# Patient Record
Sex: FEMALE | Race: WHITE
[De-identification: names, ages, dates, MRNs, and addresses within clinical notes are randomized per-mention and may not be internally consistent; named-entity substitution may affect disease eponyms.]

---

## 2017-08-26 ENCOUNTER — HEALTH MAINTENANCE LETTER (OUTPATIENT)
Age: 42
End: 2017-08-26

## 2019-11-07 ENCOUNTER — HEALTH MAINTENANCE LETTER (OUTPATIENT)
Age: 44
End: 2019-11-07

## 2020-11-29 ENCOUNTER — HEALTH MAINTENANCE LETTER (OUTPATIENT)
Age: 45
End: 2020-11-29

## 2021-02-14 ENCOUNTER — HEALTH MAINTENANCE LETTER (OUTPATIENT)
Age: 46
End: 2021-02-14

## 2021-09-25 ENCOUNTER — HEALTH MAINTENANCE LETTER (OUTPATIENT)
Age: 46
End: 2021-09-25

## 2022-01-09 ENCOUNTER — HEALTH MAINTENANCE LETTER (OUTPATIENT)
Age: 47
End: 2022-01-09

## 2022-03-06 ENCOUNTER — HEALTH MAINTENANCE LETTER (OUTPATIENT)
Age: 47
End: 2022-03-06

## 2022-12-26 ENCOUNTER — HEALTH MAINTENANCE LETTER (OUTPATIENT)
Age: 47
End: 2022-12-26

## 2023-04-16 ENCOUNTER — HEALTH MAINTENANCE LETTER (OUTPATIENT)
Age: 48
End: 2023-04-16

## 2023-10-05 ENCOUNTER — APPOINTMENT (RX ONLY)
Dept: URBAN - METROPOLITAN AREA CLINIC 115 | Facility: CLINIC | Age: 48
Setting detail: DERMATOLOGY
End: 2023-10-05

## 2023-10-05 DIAGNOSIS — L70.0 ACNE VULGARIS: ICD-10-CM | Status: INADEQUATELY CONTROLLED

## 2023-10-05 PROCEDURE — ? COUNSELING

## 2023-10-05 PROCEDURE — ? TREATMENT REGIMEN

## 2023-10-05 PROCEDURE — ? ISOTRETINOIN INITIATION

## 2023-10-05 PROCEDURE — 99204 OFFICE O/P NEW MOD 45 MIN: CPT

## 2023-10-05 PROCEDURE — ? PRESCRIPTION

## 2023-10-05 ASSESSMENT — LOCATION SIMPLE DESCRIPTION DERM
LOCATION SIMPLE: LEFT CHEEK
LOCATION SIMPLE: RIGHT CHEEK

## 2023-10-05 ASSESSMENT — LOCATION DETAILED DESCRIPTION DERM
LOCATION DETAILED: RIGHT INFERIOR CENTRAL MALAR CHEEK
LOCATION DETAILED: LEFT INFERIOR CENTRAL MALAR CHEEK

## 2023-10-05 ASSESSMENT — SEVERITY ASSESSMENT OVERALL AMONG ALL PATIENTS
IN YOUR EXPERIENCE, AMONG ALL PATIENTS YOU HAVE SEEN WITH THIS CONDITION, HOW SEVERE IS THIS PATIENT'S CONDITION?: MULTIPLE INFLAMMATORY LESIONS BUT NONINFLAMMATORY LESIONS PREDOMINATE

## 2023-10-05 ASSESSMENT — LOCATION ZONE DERM: LOCATION ZONE: FACE

## 2023-10-05 NOTE — PROCEDURE: TREATMENT REGIMEN
Initiate Treatment: Absorica 30 mg capsule \\nSig: Take one capsule daily with fatty meal.
Detail Level: Zone

## 2023-10-05 NOTE — PROCEDURE: COUNSELING
Use Enhanced Medication Counseling?: No
Azelaic Acid Pregnancy And Lactation Text: This medication is considered safe during pregnancy and breast feeding.
Spironolactone Counseling: Patient advised regarding risks of diarrhea, abdominal pain, hyperkalemia, birth defects (for female patients), liver toxicity and renal toxicity. The patient may need blood work to monitor liver and kidney function and potassium levels while on therapy. The patient verbalized understanding of the proper use and possible adverse effects of spironolactone.  All of the patient's questions and concerns were addressed.
Isotretinoin Pregnancy And Lactation Text: This medication is Pregnancy Category X and is considered extremely dangerous during pregnancy. It is unknown if it is excreted in breast milk.
Topical Clindamycin Counseling: Patient counseled that this medication may cause skin irritation or allergic reactions.  In the event of skin irritation, the patient was advised to reduce the amount of the drug applied or use it less frequently.   The patient verbalized understanding of the proper use and possible adverse effects of clindamycin.  All of the patient's questions and concerns were addressed.
Dapsone Counseling: I discussed with the patient the risks of dapsone including but not limited to hemolytic anemia, agranulocytosis, rashes, methemoglobinemia, kidney failure, peripheral neuropathy, headaches, GI upset, and liver toxicity.  Patients who start dapsone require monitoring including baseline LFTs and weekly CBCs for the first month, then every month thereafter.  The patient verbalized understanding of the proper use and possible adverse effects of dapsone.  All of the patient's questions and concerns were addressed.
Aklief Pregnancy And Lactation Text: It is unknown if this medication is safe to use during pregnancy.  It is unknown if this medication is excreted in breast milk.  Breastfeeding women should use the topical cream on the smallest area of the skin for the shortest time needed while breastfeeding.  Do not apply to nipple and areola.
Tazorac Counseling:  Patient advised that medication is irritating and drying.  Patient may need to apply sparingly and wash off after an hour before eventually leaving it on overnight.  The patient verbalized understanding of the proper use and possible adverse effects of tazorac.  All of the patient's questions and concerns were addressed.
Winlevi Pregnancy And Lactation Text: This medication is considered safe during pregnancy and breastfeeding.
Azithromycin Pregnancy And Lactation Text: This medication is considered safe during pregnancy and is also secreted in breast milk.
Bactrim Counseling:  I discussed with the patient the risks of sulfa antibiotics including but not limited to GI upset, allergic reaction, drug rash, diarrhea, dizziness, photosensitivity, and yeast infections.  Rarely, more serious reactions can occur including but not limited to aplastic anemia, agranulocytosis, methemoglobinemia, blood dyscrasias, liver or kidney failure, lung infiltrates or desquamative/blistering drug rashes.
Topical Sulfur Applications Pregnancy And Lactation Text: This medication is Pregnancy Category C and has an unknown safety profile during pregnancy. It is unknown if this topical medication is excreted in breast milk.
Doxycycline Pregnancy And Lactation Text: This medication is Pregnancy Category D and not consider safe during pregnancy. It is also excreted in breast milk but is considered safe for shorter treatment courses.
Minocycline Counseling: Patient advised regarding possible photosensitivity and discoloration of the teeth, skin, lips, tongue and gums.  Patient instructed to avoid sunlight, if possible.  When exposed to sunlight, patients should wear protective clothing, sunglasses, and sunscreen.  The patient was instructed to call the office immediately if the following severe adverse effects occur:  hearing changes, easy bruising/bleeding, severe headache, or vision changes.  The patient verbalized understanding of the proper use and possible adverse effects of minocycline.  All of the patient's questions and concerns were addressed.
Tetracycline Pregnancy And Lactation Text: This medication is Pregnancy Category D and not consider safe during pregnancy. It is also excreted in breast milk.
Topical Retinoid counseling:  Patient advised to apply a pea-sized amount only at bedtime and wait 30 minutes after washing their face before applying.  If too drying, patient may add a non-comedogenic moisturizer. The patient verbalized understanding of the proper use and possible adverse effects of retinoids.  All of the patient's questions and concerns were addressed.
Birth Control Pills Counseling: Birth Control Pill Counseling: I discussed with the patient the potential side effects of OCPs including but not limited to increased risk of stroke, heart attack, thrombophlebitis, deep venous thrombosis, hepatic adenomas, breast changes, GI upset, headaches, and depression.  The patient verbalized understanding of the proper use and possible adverse effects of OCPs. All of the patient's questions and concerns were addressed.
Erythromycin Pregnancy And Lactation Text: This medication is Pregnancy Category B and is considered safe during pregnancy. It is also excreted in breast milk.
Sarecycline Counseling: Patient advised regarding possible photosensitivity and discoloration of the teeth, skin, lips, tongue and gums.  Patient instructed to avoid sunlight, if possible.  When exposed to sunlight, patients should wear protective clothing, sunglasses, and sunscreen.  The patient was instructed to call the office immediately if the following severe adverse effects occur:  hearing changes, easy bruising/bleeding, severe headache, or vision changes.  The patient verbalized understanding of the proper use and possible adverse effects of sarecycline.  All of the patient's questions and concerns were addressed.
Birth Control Pills Pregnancy And Lactation Text: This medication should be avoided if pregnant and for the first 30 days post-partum.
Tazorac Pregnancy And Lactation Text: This medication is not safe during pregnancy. It is unknown if this medication is excreted in breast milk.
Azelaic Acid Counseling: Patient counseled that medicine may cause skin irritation and to avoid applying near the eyes.  In the event of skin irritation, the patient was advised to reduce the amount of the drug applied or use it less frequently.   The patient verbalized understanding of the proper use and possible adverse effects of azelaic acid.  All of the patient's questions and concerns were addressed.
Isotretinoin Counseling: Patient should get monthly blood tests, not donate blood, not drive at night if vision affected, not share medication, and not undergo elective surgery for 6 months after tx completed. Side effects reviewed, pt to contact office should one occur.
Topical Clindamycin Pregnancy And Lactation Text: This medication is Pregnancy Category B and is considered safe during pregnancy. It is unknown if it is excreted in breast milk.
Spironolactone Pregnancy And Lactation Text: This medication can cause feminization of the male fetus and should be avoided during pregnancy. The active metabolite is also found in breast milk.
Benzoyl Peroxide Counseling: Patient counseled that medicine may cause skin irritation and bleach clothing.  In the event of skin irritation, the patient was advised to reduce the amount of the drug applied or use it less frequently.   The patient verbalized understanding of the proper use and possible adverse effects of benzoyl peroxide.  All of the patient's questions and concerns were addressed.
Azithromycin Counseling:  I discussed with the patient the risks of azithromycin including but not limited to GI upset, allergic reaction, drug rash, diarrhea, and yeast infections.
Dapsone Pregnancy And Lactation Text: This medication is Pregnancy Category C and is not considered safe during pregnancy or breast feeding.
High Dose Vitamin A Counseling: Side effects reviewed, pt to contact office should one occur.
Erythromycin Counseling:  I discussed with the patient the risks of erythromycin including but not limited to GI upset, allergic reaction, drug rash, diarrhea, increase in liver enzymes, and yeast infections.
Doxycycline Counseling:  Patient counseled regarding possible photosensitivity and increased risk for sunburn.  Patient instructed to avoid sunlight, if possible.  When exposed to sunlight, patients should wear protective clothing, sunglasses, and sunscreen.  The patient was instructed to call the office immediately if the following severe adverse effects occur:  hearing changes, easy bruising/bleeding, severe headache, or vision changes.  The patient verbalized understanding of the proper use and possible adverse effects of doxycycline.  All of the patient's questions and concerns were addressed.
High Dose Vitamin A Pregnancy And Lactation Text: High dose vitamin A therapy is contraindicated during pregnancy and breast feeding.
Tetracycline Counseling: Patient counseled regarding possible photosensitivity and increased risk for sunburn.  Patient instructed to avoid sunlight, if possible.  When exposed to sunlight, patients should wear protective clothing, sunglasses, and sunscreen.  The patient was instructed to call the office immediately if the following severe adverse effects occur:  hearing changes, easy bruising/bleeding, severe headache, or vision changes.  The patient verbalized understanding of the proper use and possible adverse effects of tetracycline.  All of the patient's questions and concerns were addressed. Patient understands to avoid pregnancy while on therapy due to potential birth defects.
Benzoyl Peroxide Pregnancy And Lactation Text: This medication is Pregnancy Category C. It is unknown if benzoyl peroxide is excreted in breast milk.
Topical Sulfur Applications Counseling: Topical Sulfur Counseling: Patient counseled that this medication may cause skin irritation or allergic reactions.  In the event of skin irritation, the patient was advised to reduce the amount of the drug applied or use it less frequently.   The patient verbalized understanding of the proper use and possible adverse effects of topical sulfur application.  All of the patient's questions and concerns were addressed.
Bactrim Pregnancy And Lactation Text: This medication is Pregnancy Category D and is known to cause fetal risk.  It is also excreted in breast milk.
Aklief counseling:  Patient advised to apply a pea-sized amount only at bedtime and wait 30 minutes after washing their face before applying.  If too drying, patient may add a non-comedogenic moisturizer.  The most commonly reported side effects including irritation, redness, scaling, dryness, stinging, burning, itching, and increased risk of sunburn.  The patient verbalized understanding of the proper use and possible adverse effects of retinoids.  All of the patient's questions and concerns were addressed.
Topical Retinoid Pregnancy And Lactation Text: This medication is Pregnancy Category C. It is unknown if this medication is excreted in breast milk.
Winlevi Counseling:  I discussed with the patient the risks of topical clascoterone including but not limited to erythema, scaling, itching, and stinging. Patient voiced their understanding.
Detail Level: Zone

## 2023-10-05 NOTE — PROCEDURE: ISOTRETINOIN INITIATION
Detail Level: Zone
Anticipated Starting Dosage (Optional): 30mg Daily
Ipledge Number (Optional): [id number]
Patient Reported Weight (Optional - Include Units): 123

## 2023-10-06 RX ORDER — ISOTRETINOIN 30 MG/1
CAPSULE ORAL
Qty: 30 | Refills: 0 | Status: ERX | COMMUNITY
Start: 2023-10-06

## 2023-10-06 RX ADMIN — ISOTRETINOIN: 30 CAPSULE ORAL at 00:00

## 2023-11-10 ENCOUNTER — APPOINTMENT (RX ONLY)
Dept: URBAN - METROPOLITAN AREA CLINIC 115 | Facility: CLINIC | Age: 48
Setting detail: DERMATOLOGY
End: 2023-11-10

## 2023-11-10 DIAGNOSIS — L81.4 OTHER MELANIN HYPERPIGMENTATION: ICD-10-CM | Status: STABLE

## 2023-11-10 DIAGNOSIS — D18.0 HEMANGIOMA: ICD-10-CM | Status: STABLE

## 2023-11-10 DIAGNOSIS — D22 MELANOCYTIC NEVI: ICD-10-CM | Status: STABLE

## 2023-11-10 DIAGNOSIS — L70.0 ACNE VULGARIS: ICD-10-CM | Status: RESOLVING

## 2023-11-10 DIAGNOSIS — L57.8 OTHER SKIN CHANGES DUE TO CHRONIC EXPOSURE TO NONIONIZING RADIATION: ICD-10-CM | Status: STABLE

## 2023-11-10 PROBLEM — D18.01 HEMANGIOMA OF SKIN AND SUBCUTANEOUS TISSUE: Status: ACTIVE | Noted: 2023-11-10

## 2023-11-10 PROBLEM — D22.5 MELANOCYTIC NEVI OF TRUNK: Status: ACTIVE | Noted: 2023-11-10

## 2023-11-10 PROBLEM — D22.62 MELANOCYTIC NEVI OF LEFT UPPER LIMB, INCLUDING SHOULDER: Status: ACTIVE | Noted: 2023-11-10

## 2023-11-10 PROCEDURE — ? ISOTRETINOIN MONITORING

## 2023-11-10 PROCEDURE — ? PRESCRIPTION

## 2023-11-10 PROCEDURE — ? COUNSELING

## 2023-11-10 PROCEDURE — ? ISOTRETINOIN INITIATION

## 2023-11-10 PROCEDURE — 99214 OFFICE O/P EST MOD 30 MIN: CPT

## 2023-11-10 RX ORDER — ISOTRETINOIN 30 MG/1
CAPSULE ORAL
Qty: 60 | Refills: 0 | Status: ERX

## 2023-11-10 ASSESSMENT — LOCATION DETAILED DESCRIPTION DERM
LOCATION DETAILED: LEFT ANTERIOR SHOULDER
LOCATION DETAILED: SUPERIOR THORACIC SPINE
LOCATION DETAILED: LEFT SUPERIOR MEDIAL UPPER BACK
LOCATION DETAILED: LEFT ANTERIOR DISTAL THIGH
LOCATION DETAILED: LEFT PROXIMAL PRETIBIAL REGION
LOCATION DETAILED: RIGHT PROXIMAL DORSAL FOREARM
LOCATION DETAILED: RIGHT VENTRAL DISTAL FOREARM
LOCATION DETAILED: LEFT VENTRAL DISTAL FOREARM
LOCATION DETAILED: RIGHT INFERIOR POSTERIOR NECK
LOCATION DETAILED: LEFT VENTRAL LATERAL DISTAL FOREARM
LOCATION DETAILED: RIGHT ANTERIOR DISTAL THIGH
LOCATION DETAILED: LEFT LATERAL SUPERIOR CHEST
LOCATION DETAILED: RIGHT DISTAL PRETIBIAL REGION
LOCATION DETAILED: RIGHT PROXIMAL POSTERIOR UPPER ARM
LOCATION DETAILED: RIGHT MEDIAL DISTAL PRETIBIAL REGION
LOCATION DETAILED: LEFT PROXIMAL DORSAL FOREARM
LOCATION DETAILED: LEFT DISTAL CALF
LOCATION DETAILED: RIGHT SUPERIOR LATERAL MIDBACK
LOCATION DETAILED: RIGHT DISTAL CALF
LOCATION DETAILED: PERIUMBILICAL SKIN
LOCATION DETAILED: RIGHT ANTERIOR SHOULDER
LOCATION DETAILED: RIGHT INFERIOR CENTRAL MALAR CHEEK
LOCATION DETAILED: RIGHT LATERAL ABDOMEN
LOCATION DETAILED: RIGHT INFERIOR ANTERIOR NECK
LOCATION DETAILED: LEFT PROXIMAL POSTERIOR UPPER ARM
LOCATION DETAILED: LEFT POSTERIOR SHOULDER
LOCATION DETAILED: LEFT INFERIOR CENTRAL MALAR CHEEK
LOCATION DETAILED: RIGHT CLAVICULAR SKIN

## 2023-11-10 ASSESSMENT — LOCATION SIMPLE DESCRIPTION DERM
LOCATION SIMPLE: LEFT POSTERIOR UPPER ARM
LOCATION SIMPLE: LEFT CHEEK
LOCATION SIMPLE: RIGHT SHOULDER
LOCATION SIMPLE: ABDOMEN
LOCATION SIMPLE: LEFT CALF
LOCATION SIMPLE: UPPER BACK
LOCATION SIMPLE: LEFT SHOULDER
LOCATION SIMPLE: RIGHT ANTERIOR NECK
LOCATION SIMPLE: RIGHT LOWER BACK
LOCATION SIMPLE: RIGHT THIGH
LOCATION SIMPLE: RIGHT CALF
LOCATION SIMPLE: POSTERIOR NECK
LOCATION SIMPLE: RIGHT PRETIBIAL REGION
LOCATION SIMPLE: RIGHT CLAVICULAR SKIN
LOCATION SIMPLE: RIGHT FOREARM
LOCATION SIMPLE: RIGHT CHEEK
LOCATION SIMPLE: LEFT FOREARM
LOCATION SIMPLE: LEFT UPPER BACK
LOCATION SIMPLE: LEFT PRETIBIAL REGION
LOCATION SIMPLE: CHEST
LOCATION SIMPLE: RIGHT POSTERIOR UPPER ARM
LOCATION SIMPLE: LEFT THIGH

## 2023-11-10 ASSESSMENT — LOCATION ZONE DERM
LOCATION ZONE: TRUNK
LOCATION ZONE: ARM
LOCATION ZONE: NECK
LOCATION ZONE: FACE
LOCATION ZONE: LEG

## 2023-11-10 NOTE — PROCEDURE: ISOTRETINOIN INITIATION
Detail Level: Zone
Anticipated Starting Dosage (Optional): 30mg Daily
Patient Reported Weight (Optional - Include Units): 123
never

## 2023-11-10 NOTE — PROCEDURE: ISOTRETINOIN MONITORING
Retinoid Dermatitis Aggressive Treatment: I recommended more frequent application of Cetaphil or CeraVe to the areas of dermatitis. I also prescribed a topical steroid for twice daily use until the dermatitis resolves.
Months Of Therapy Completed: 1
Female Pregnancy Counseling Text: Female patients should also be on two forms of birth control while taking this medication and for one month after their last dose.
Myalgia Monitoring: I explained this is common when taking isotretinoin. If this worsens they will contact us.
Next Month's Dosage: Continue Current Dosage
Myalgia Treatment: I explained this is common when taking isotretinoin. If this worsens they will contact us. They may try OTC ibuprofen.
Use Therapeutic Ranged Or Therapeutic Target: please select Range or Target
Cheilitis Aggressive Treatment: I recommended application of Vaseline or Aquaphor numerous times a day (as often as every hour) and before going to bed. I also prescribed a topical steroid for twice daily use.
Xerosis Normal Treatment: I recommended application of Cetaphil or CeraVe numerous times a day going to bed to all dry areas.
Pounds Preamble Statement (Weight Entered In Details Tab): Reported Weight in pounds:
Nosebleeds Normal Treatment: I explained this is common when taking isotretinoin. I recommended saline mist in each nostril multiple times a day. If this worsens they will contact us.
Dosing Month 1 (Required For Cumulative Dosing): 30mg Daily
Kilograms Preamble Statement (Weight Entered In Details Tab): Reported Weight in kilograms:
Female Completion Statement: After discussing her treatment course we decided to discontinue isotretinoin therapy at this time. I explained that she would need to continue her birth control methods for at least one month after the last dosage. She should also get a pregnancy test one month after the last dose. She shouldn't donate blood for one month after the last dose. She should call with any new symptoms of depression.
Xerosis Aggressive Treatment: I recommended application of Cetaphil or CeraVe numerous times a day going to bed to all dry areas. I also prescribed a topical steroid for twice daily use.
Lower Range (In Mg/Kg): 120
Male Completion Statement: After discussing his treatment course we decided to discontinue isotretinoin therapy at this time. He shouldn't donate blood for one month after the last dose. He should call with any new symptoms of depression.
Headache Monitoring: I recommended monitoring the headaches for now. There is no evidence of increased intracranial pressure. They were instructed to call if the headaches are worsening.
Detail Level: Zone
Upper Range (In Mg/Kg): 150
Use Enhanced Counseling Feature (Automatic): No
Target Cumulative Dosage (In Mg/Kg): 135
Hypercholesterolemia Monitoring: I explained this is common when taking isotretinoin. We will monitor closely.
What Is The Patient's Gender: Female
Retinoid Dermatitis Normal Treatment: I recommended more frequent application of Cetaphil or CeraVe to the areas of dermatitis.
Add Associated Diagnosis When Managing Medication Side Effects: Yes
Cheilitis Normal Treatment: I recommended application of Vaseline or Aquaphor numerous times a day (as often as every hour) and before going to bed.
Xerosis Normal Treatment: I recommended application of Cetaphil or CeraVe numerous times a day and before going to bed to all dry areas.
Are Labs Available For Review?: No- Labs Deferred This Month
Ipledge Number (Optional): [id number]
Xerosis Aggressive Treatment: I recommended application of Cetaphil or CeraVe numerous times a day and before going to bed to all dry areas. I also prescribed a topical steroid for twice daily use.
Patient Weight (Optional But Required For Cumulative Dose-Numbers And Decimals Only): 123
Weight Units: pounds
Counseling Text: I reviewed the side effect in detail. Patient should get monthly blood tests, not donate blood, not drive at night if vision affected, and not share medication.

## 2023-11-10 NOTE — HPI: FULL BODY SKIN EXAMINATION
What Type Of Note Output Would You Prefer (Optional)?: Standard Output
What Is The Reason For Today's Visit?: Initial Full Body Skin Examination
What Is The Reason For Today's Visit? (Being Monitored For X): concerning skin lesions on a periodic basis
How Severe Are Your Spot(S)?: mild

## 2023-11-10 NOTE — PROCEDURE: COUNSELING
Use Enhanced Medication Counseling?: No
Azelaic Acid Pregnancy And Lactation Text: This medication is considered safe during pregnancy and breast feeding.
Spironolactone Counseling: Patient advised regarding risks of diarrhea, abdominal pain, hyperkalemia, birth defects (for female patients), liver toxicity and renal toxicity. The patient may need blood work to monitor liver and kidney function and potassium levels while on therapy. The patient verbalized understanding of the proper use and possible adverse effects of spironolactone.  All of the patient's questions and concerns were addressed.
Isotretinoin Pregnancy And Lactation Text: This medication is Pregnancy Category X and is considered extremely dangerous during pregnancy. It is unknown if it is excreted in breast milk.
Topical Clindamycin Counseling: Patient counseled that this medication may cause skin irritation or allergic reactions.  In the event of skin irritation, the patient was advised to reduce the amount of the drug applied or use it less frequently.   The patient verbalized understanding of the proper use and possible adverse effects of clindamycin.  All of the patient's questions and concerns were addressed.
Dapsone Counseling: I discussed with the patient the risks of dapsone including but not limited to hemolytic anemia, agranulocytosis, rashes, methemoglobinemia, kidney failure, peripheral neuropathy, headaches, GI upset, and liver toxicity.  Patients who start dapsone require monitoring including baseline LFTs and weekly CBCs for the first month, then every month thereafter.  The patient verbalized understanding of the proper use and possible adverse effects of dapsone.  All of the patient's questions and concerns were addressed.
Aklief Pregnancy And Lactation Text: It is unknown if this medication is safe to use during pregnancy.  It is unknown if this medication is excreted in breast milk.  Breastfeeding women should use the topical cream on the smallest area of the skin for the shortest time needed while breastfeeding.  Do not apply to nipple and areola.
Tazorac Counseling:  Patient advised that medication is irritating and drying.  Patient may need to apply sparingly and wash off after an hour before eventually leaving it on overnight.  The patient verbalized understanding of the proper use and possible adverse effects of tazorac.  All of the patient's questions and concerns were addressed.
Winlevi Pregnancy And Lactation Text: This medication is considered safe during pregnancy and breastfeeding.
Azithromycin Pregnancy And Lactation Text: This medication is considered safe during pregnancy and is also secreted in breast milk.
Bactrim Counseling:  I discussed with the patient the risks of sulfa antibiotics including but not limited to GI upset, allergic reaction, drug rash, diarrhea, dizziness, photosensitivity, and yeast infections.  Rarely, more serious reactions can occur including but not limited to aplastic anemia, agranulocytosis, methemoglobinemia, blood dyscrasias, liver or kidney failure, lung infiltrates or desquamative/blistering drug rashes.
Topical Sulfur Applications Pregnancy And Lactation Text: This medication is Pregnancy Category C and has an unknown safety profile during pregnancy. It is unknown if this topical medication is excreted in breast milk.
Doxycycline Pregnancy And Lactation Text: This medication is Pregnancy Category D and not consider safe during pregnancy. It is also excreted in breast milk but is considered safe for shorter treatment courses.
Minocycline Counseling: Patient advised regarding possible photosensitivity and discoloration of the teeth, skin, lips, tongue and gums.  Patient instructed to avoid sunlight, if possible.  When exposed to sunlight, patients should wear protective clothing, sunglasses, and sunscreen.  The patient was instructed to call the office immediately if the following severe adverse effects occur:  hearing changes, easy bruising/bleeding, severe headache, or vision changes.  The patient verbalized understanding of the proper use and possible adverse effects of minocycline.  All of the patient's questions and concerns were addressed.
Tetracycline Pregnancy And Lactation Text: This medication is Pregnancy Category D and not consider safe during pregnancy. It is also excreted in breast milk.
Topical Retinoid counseling:  Patient advised to apply a pea-sized amount only at bedtime and wait 30 minutes after washing their face before applying.  If too drying, patient may add a non-comedogenic moisturizer. The patient verbalized understanding of the proper use and possible adverse effects of retinoids.  All of the patient's questions and concerns were addressed.
Birth Control Pills Counseling: Birth Control Pill Counseling: I discussed with the patient the potential side effects of OCPs including but not limited to increased risk of stroke, heart attack, thrombophlebitis, deep venous thrombosis, hepatic adenomas, breast changes, GI upset, headaches, and depression.  The patient verbalized understanding of the proper use and possible adverse effects of OCPs. All of the patient's questions and concerns were addressed.
Erythromycin Pregnancy And Lactation Text: This medication is Pregnancy Category B and is considered safe during pregnancy. It is also excreted in breast milk.
Sarecycline Counseling: Patient advised regarding possible photosensitivity and discoloration of the teeth, skin, lips, tongue and gums.  Patient instructed to avoid sunlight, if possible.  When exposed to sunlight, patients should wear protective clothing, sunglasses, and sunscreen.  The patient was instructed to call the office immediately if the following severe adverse effects occur:  hearing changes, easy bruising/bleeding, severe headache, or vision changes.  The patient verbalized understanding of the proper use and possible adverse effects of sarecycline.  All of the patient's questions and concerns were addressed.
Birth Control Pills Pregnancy And Lactation Text: This medication should be avoided if pregnant and for the first 30 days post-partum.
Tazorac Pregnancy And Lactation Text: This medication is not safe during pregnancy. It is unknown if this medication is excreted in breast milk.
Azelaic Acid Counseling: Patient counseled that medicine may cause skin irritation and to avoid applying near the eyes.  In the event of skin irritation, the patient was advised to reduce the amount of the drug applied or use it less frequently.   The patient verbalized understanding of the proper use and possible adverse effects of azelaic acid.  All of the patient's questions and concerns were addressed.
Isotretinoin Counseling: Patient should get monthly blood tests, not donate blood, not drive at night if vision affected, not share medication, and not undergo elective surgery for 6 months after tx completed. Side effects reviewed, pt to contact office should one occur.
Topical Clindamycin Pregnancy And Lactation Text: This medication is Pregnancy Category B and is considered safe during pregnancy. It is unknown if it is excreted in breast milk.
Spironolactone Pregnancy And Lactation Text: This medication can cause feminization of the male fetus and should be avoided during pregnancy. The active metabolite is also found in breast milk.
Benzoyl Peroxide Counseling: Patient counseled that medicine may cause skin irritation and bleach clothing.  In the event of skin irritation, the patient was advised to reduce the amount of the drug applied or use it less frequently.   The patient verbalized understanding of the proper use and possible adverse effects of benzoyl peroxide.  All of the patient's questions and concerns were addressed.
Azithromycin Counseling:  I discussed with the patient the risks of azithromycin including but not limited to GI upset, allergic reaction, drug rash, diarrhea, and yeast infections.
Dapsone Pregnancy And Lactation Text: This medication is Pregnancy Category C and is not considered safe during pregnancy or breast feeding.
High Dose Vitamin A Counseling: Side effects reviewed, pt to contact office should one occur.
Erythromycin Counseling:  I discussed with the patient the risks of erythromycin including but not limited to GI upset, allergic reaction, drug rash, diarrhea, increase in liver enzymes, and yeast infections.
Doxycycline Counseling:  Patient counseled regarding possible photosensitivity and increased risk for sunburn.  Patient instructed to avoid sunlight, if possible.  When exposed to sunlight, patients should wear protective clothing, sunglasses, and sunscreen.  The patient was instructed to call the office immediately if the following severe adverse effects occur:  hearing changes, easy bruising/bleeding, severe headache, or vision changes.  The patient verbalized understanding of the proper use and possible adverse effects of doxycycline.  All of the patient's questions and concerns were addressed.
High Dose Vitamin A Pregnancy And Lactation Text: High dose vitamin A therapy is contraindicated during pregnancy and breast feeding.
Tetracycline Counseling: Patient counseled regarding possible photosensitivity and increased risk for sunburn.  Patient instructed to avoid sunlight, if possible.  When exposed to sunlight, patients should wear protective clothing, sunglasses, and sunscreen.  The patient was instructed to call the office immediately if the following severe adverse effects occur:  hearing changes, easy bruising/bleeding, severe headache, or vision changes.  The patient verbalized understanding of the proper use and possible adverse effects of tetracycline.  All of the patient's questions and concerns were addressed. Patient understands to avoid pregnancy while on therapy due to potential birth defects.
Benzoyl Peroxide Pregnancy And Lactation Text: This medication is Pregnancy Category C. It is unknown if benzoyl peroxide is excreted in breast milk.
Topical Sulfur Applications Counseling: Topical Sulfur Counseling: Patient counseled that this medication may cause skin irritation or allergic reactions.  In the event of skin irritation, the patient was advised to reduce the amount of the drug applied or use it less frequently.   The patient verbalized understanding of the proper use and possible adverse effects of topical sulfur application.  All of the patient's questions and concerns were addressed.
Bactrim Pregnancy And Lactation Text: This medication is Pregnancy Category D and is known to cause fetal risk.  It is also excreted in breast milk.
Aklief counseling:  Patient advised to apply a pea-sized amount only at bedtime and wait 30 minutes after washing their face before applying.  If too drying, patient may add a non-comedogenic moisturizer.  The most commonly reported side effects including irritation, redness, scaling, dryness, stinging, burning, itching, and increased risk of sunburn.  The patient verbalized understanding of the proper use and possible adverse effects of retinoids.  All of the patient's questions and concerns were addressed.
Topical Retinoid Pregnancy And Lactation Text: This medication is Pregnancy Category C. It is unknown if this medication is excreted in breast milk.
Winlevi Counseling:  I discussed with the patient the risks of topical clascoterone including but not limited to erythema, scaling, itching, and stinging. Patient voiced their understanding.
Detail Level: Zone
Detail Level: Detailed
Detail Level: Generalized
Detail Level: Simple

## 2023-12-20 ENCOUNTER — APPOINTMENT (RX ONLY)
Dept: URBAN - METROPOLITAN AREA CLINIC 115 | Facility: CLINIC | Age: 48
Setting detail: DERMATOLOGY
End: 2023-12-20

## 2023-12-20 VITALS — HEIGHT: 62 IN | WEIGHT: 118 LBS

## 2023-12-20 VITALS — WEIGHT: 118 LBS | HEIGHT: 62 IN

## 2023-12-20 DIAGNOSIS — L70.0 ACNE VULGARIS: ICD-10-CM

## 2023-12-20 PROCEDURE — ? ISOTRETINOIN MONITORING

## 2023-12-20 PROCEDURE — ? ORDER TESTS

## 2023-12-20 PROCEDURE — ? PRESCRIPTION

## 2023-12-20 PROCEDURE — ? COUNSELING

## 2023-12-20 PROCEDURE — 99214 OFFICE O/P EST MOD 30 MIN: CPT

## 2023-12-20 RX ORDER — ISOTRETINOIN 30 MG/1
CAPSULE ORAL
Qty: 60 | Refills: 0 | Status: ERX

## 2023-12-20 ASSESSMENT — LOCATION SIMPLE DESCRIPTION DERM
LOCATION SIMPLE: RIGHT CHEEK
LOCATION SIMPLE: LEFT CHEEK

## 2023-12-20 ASSESSMENT — LOCATION DETAILED DESCRIPTION DERM
LOCATION DETAILED: LEFT INFERIOR CENTRAL MALAR CHEEK
LOCATION DETAILED: RIGHT INFERIOR CENTRAL MALAR CHEEK

## 2023-12-20 ASSESSMENT — LOCATION ZONE DERM: LOCATION ZONE: FACE

## 2023-12-20 NOTE — PROCEDURE: ORDER TESTS
Expected Date Of Service: 12/20/2023
Billing Type: Third-Party Bill
Bill For Surgical Tray: no
Performing Laboratory: [id number]

## 2023-12-20 NOTE — PROCEDURE: ISOTRETINOIN MONITORING
Retinoid Dermatitis Aggressive Treatment: I recommended more frequent application of Cetaphil or CeraVe to the areas of dermatitis. I also prescribed a topical steroid for twice daily use until the dermatitis resolves.
Months Of Therapy Completed: 2
Female Pregnancy Counseling Text: Female patients should also be on two forms of birth control while taking this medication and for one month after their last dose.
Myalgia Monitoring: I explained this is common when taking isotretinoin. If this worsens they will contact us.
Next Month's Dosage: Continue Current Dosage
Myalgia Treatment: I explained this is common when taking isotretinoin. If this worsens they will contact us. They may try OTC ibuprofen.
Use Therapeutic Ranged Or Therapeutic Target: please select Range or Target
Cheilitis Aggressive Treatment: I recommended application of Vaseline or Aquaphor numerous times a day (as often as every hour) and before going to bed. I also prescribed a topical steroid for twice daily use.
Xerosis Normal Treatment: I recommended application of Cetaphil or CeraVe numerous times a day going to bed to all dry areas.
Pounds Preamble Statement (Weight Entered In Details Tab): Reported Weight in pounds:
Nosebleeds Normal Treatment: I explained this is common when taking isotretinoin. I recommended saline mist in each nostril multiple times a day. If this worsens they will contact us.
Comments: Continue 30mg bid for month 3
Dosing Month 1 (Required For Cumulative Dosing): 30mg Daily
Kilograms Preamble Statement (Weight Entered In Details Tab): Reported Weight in kilograms:
Female Completion Statement: After discussing her treatment course we decided to discontinue isotretinoin therapy at this time. I explained that she would need to continue her birth control methods for at least one month after the last dosage. She should also get a pregnancy test one month after the last dose. She shouldn't donate blood for one month after the last dose. She should call with any new symptoms of depression.
Xerosis Aggressive Treatment: I recommended application of Cetaphil or CeraVe numerous times a day going to bed to all dry areas. I also prescribed a topical steroid for twice daily use.
Lower Range (In Mg/Kg): 120
Male Completion Statement: After discussing his treatment course we decided to discontinue isotretinoin therapy at this time. He shouldn't donate blood for one month after the last dose. He should call with any new symptoms of depression.
Headache Monitoring: I recommended monitoring the headaches for now. There is no evidence of increased intracranial pressure. They were instructed to call if the headaches are worsening.
Dosing Month 2 (Required For Cumulative Dosing): 30mg BID
Detail Level: Zone
Upper Range (In Mg/Kg): 150
Use Enhanced Counseling Feature (Automatic): No
Target Cumulative Dosage (In Mg/Kg): 135
Hypercholesterolemia Monitoring: I explained this is common when taking isotretinoin. We will monitor closely.
What Is The Patient's Gender: Female
Is Xerosis Present?: Yes - Normal Treatment
Retinoid Dermatitis Normal Treatment: I recommended more frequent application of Cetaphil or CeraVe to the areas of dermatitis.
Add Associated Diagnosis When Managing Medication Side Effects: Yes
Cheilitis Normal Treatment: I recommended application of Vaseline or Aquaphor numerous times a day (as often as every hour) and before going to bed.
Xerosis Normal Treatment: I recommended application of Cetaphil or CeraVe numerous times a day and before going to bed to all dry areas.
Are Labs Available For Review?: No- Labs Deferred This Month
Ipledge Number (Optional): [id number]
Xerosis Aggressive Treatment: I recommended application of Cetaphil or CeraVe numerous times a day and before going to bed to all dry areas. I also prescribed a topical steroid for twice daily use.
Patient Weight (Optional But Required For Cumulative Dose-Numbers And Decimals Only): 123
Weight Units: pounds
Counseling Text: I reviewed the side effect in detail. Patient should get monthly blood tests, not donate blood, not drive at night if vision affected, and not share medication.

## 2024-01-22 ENCOUNTER — APPOINTMENT (RX ONLY)
Dept: URBAN - METROPOLITAN AREA CLINIC 115 | Facility: CLINIC | Age: 49
Setting detail: DERMATOLOGY
End: 2024-01-22

## 2024-01-22 DIAGNOSIS — L70.0 ACNE VULGARIS: ICD-10-CM

## 2024-01-22 PROCEDURE — ? ISOTRETINOIN MONITORING

## 2024-01-22 PROCEDURE — 99214 OFFICE O/P EST MOD 30 MIN: CPT

## 2024-01-22 PROCEDURE — ? COUNSELING

## 2024-01-22 PROCEDURE — ? ORDER TESTS

## 2024-01-22 PROCEDURE — ? PRESCRIPTION

## 2024-01-22 RX ORDER — ISOTRETINOIN 30 MG/1
CAPSULE ORAL
Qty: 60 | Refills: 0 | Status: ERX

## 2024-01-22 ASSESSMENT — LOCATION ZONE DERM: LOCATION ZONE: FACE

## 2024-01-22 ASSESSMENT — LOCATION SIMPLE DESCRIPTION DERM
LOCATION SIMPLE: LEFT CHEEK
LOCATION SIMPLE: RIGHT CHEEK

## 2024-01-22 NOTE — PROCEDURE: ORDER TESTS
Expected Date Of Service: 01/22/2024
Billing Type: Third-Party Bill
Performing Laboratory: [id number]
Bill For Surgical Tray: no

## 2024-01-22 NOTE — PROCEDURE: ISOTRETINOIN MONITORING
Retinoid Dermatitis Aggressive Treatment: I recommended more frequent application of Cetaphil or CeraVe to the areas of dermatitis. I also prescribed a topical steroid for twice daily use until the dermatitis resolves.
Months Of Therapy Completed: 3
Female Pregnancy Counseling Text: Female patients should also be on two forms of birth control while taking this medication and for one month after their last dose.
Myalgia Monitoring: I explained this is common when taking isotretinoin. If this worsens they will contact us.
Next Month's Dosage: Continue Current Dosage
Myalgia Treatment: I explained this is common when taking isotretinoin. If this worsens they will contact us. They may try OTC ibuprofen.
Use Therapeutic Ranged Or Therapeutic Target: please select Range or Target
Cheilitis Aggressive Treatment: I recommended application of Vaseline or Aquaphor numerous times a day (as often as every hour) and before going to bed. I also prescribed a topical steroid for twice daily use.
Xerosis Normal Treatment: I recommended application of Cetaphil or CeraVe numerous times a day going to bed to all dry areas.
Pounds Preamble Statement (Weight Entered In Details Tab): Reported Weight in pounds:
Nosebleeds Normal Treatment: I explained this is common when taking isotretinoin. I recommended saline mist in each nostril multiple times a day. If this worsens they will contact us.
Comments: Continue 30mg bid for month 4
Dosing Month 1 (Required For Cumulative Dosing): 30mg Daily
Kilograms Preamble Statement (Weight Entered In Details Tab): Reported Weight in kilograms:
Female Completion Statement: After discussing her treatment course we decided to discontinue isotretinoin therapy at this time. I explained that she would need to continue her birth control methods for at least one month after the last dosage. She should also get a pregnancy test one month after the last dose. She shouldn't donate blood for one month after the last dose. She should call with any new symptoms of depression.
Xerosis Aggressive Treatment: I recommended application of Cetaphil or CeraVe numerous times a day going to bed to all dry areas. I also prescribed a topical steroid for twice daily use.
Lower Range (In Mg/Kg): 120
Male Completion Statement: After discussing his treatment course we decided to discontinue isotretinoin therapy at this time. He shouldn't donate blood for one month after the last dose. He should call with any new symptoms of depression.
Headache Monitoring: I recommended monitoring the headaches for now. There is no evidence of increased intracranial pressure. They were instructed to call if the headaches are worsening.
Dosing Month 2 (Required For Cumulative Dosing): 30mg BID
Detail Level: Zone
Upper Range (In Mg/Kg): 150
Use Enhanced Counseling Feature (Automatic): No
Target Cumulative Dosage (In Mg/Kg): 135
Hypercholesterolemia Monitoring: I explained this is common when taking isotretinoin. We will monitor closely.
Dosing Month 3 (Required For Cumulative Dosing): 60mg Daily
What Is The Patient's Gender: Female
Is Xerosis Present?: Yes - Normal Treatment
Retinoid Dermatitis Normal Treatment: I recommended more frequent application of Cetaphil or CeraVe to the areas of dermatitis.
Add Associated Diagnosis When Managing Medication Side Effects: Yes
Cheilitis Normal Treatment: I recommended application of Vaseline or Aquaphor numerous times a day (as often as every hour) and before going to bed.
Xerosis Normal Treatment: I recommended application of Cetaphil or CeraVe numerous times a day and before going to bed to all dry areas.
Are Labs Available For Review?: No- Labs Deferred This Month
Ipledge Number (Optional): [id number]
Xerosis Aggressive Treatment: I recommended application of Cetaphil or CeraVe numerous times a day and before going to bed to all dry areas. I also prescribed a topical steroid for twice daily use.
Patient Weight (Optional But Required For Cumulative Dose-Numbers And Decimals Only): 123
Weight Units: pounds
Counseling Text: I reviewed the side effect in detail. Patient should get monthly blood tests, not donate blood, not drive at night if vision affected, and not share medication.

## 2024-03-05 ENCOUNTER — APPOINTMENT (RX ONLY)
Dept: URBAN - METROPOLITAN AREA CLINIC 115 | Facility: CLINIC | Age: 49
Setting detail: DERMATOLOGY
End: 2024-03-05

## 2024-03-05 DIAGNOSIS — L81.4 OTHER MELANIN HYPERPIGMENTATION: ICD-10-CM

## 2024-03-05 DIAGNOSIS — L70.0 ACNE VULGARIS: ICD-10-CM | Status: IMPROVED

## 2024-03-05 PROCEDURE — ? LAB REPORTS REVIEWED

## 2024-03-05 PROCEDURE — ? COUNSELING

## 2024-03-05 PROCEDURE — ? ISOTRETINOIN MONITORING

## 2024-03-05 PROCEDURE — ? PRESCRIPTION SAMPLES PROVIDED

## 2024-03-05 PROCEDURE — 99214 OFFICE O/P EST MOD 30 MIN: CPT

## 2024-03-05 PROCEDURE — ? SUNSCREEN RECOMMENDATIONS

## 2024-03-05 PROCEDURE — ? PRESCRIPTION

## 2024-03-05 RX ORDER — ISOTRETINOIN 30 MG/1
CAPSULE ORAL
Qty: 60 | Refills: 0 | Status: ERX

## 2024-03-05 ASSESSMENT — LOCATION DETAILED DESCRIPTION DERM
LOCATION DETAILED: RIGHT INFERIOR CENTRAL MALAR CHEEK
LOCATION DETAILED: LEFT INFERIOR CENTRAL MALAR CHEEK
LOCATION DETAILED: LEFT SUPERIOR MEDIAL BUCCAL CHEEK

## 2024-03-05 ASSESSMENT — LOCATION SIMPLE DESCRIPTION DERM
LOCATION SIMPLE: RIGHT CHEEK
LOCATION SIMPLE: LEFT CHEEK

## 2024-03-05 ASSESSMENT — LOCATION ZONE DERM: LOCATION ZONE: FACE

## 2024-03-05 NOTE — HPI: MEDICATION (ISOTRETINOIN)
How Severe Is It?: severe
Is This A New Presentation, Or A Follow-Up?: Follow Up Isotretinoin
Additional History: Patient has completed 4months at 60mg once daily.

## 2024-03-05 NOTE — PROCEDURE: COUNSELING
Use Enhanced Medication Counseling?: No
Azelaic Acid Pregnancy And Lactation Text: This medication is considered safe during pregnancy and breast feeding.
Spironolactone Counseling: Patient advised regarding risks of diarrhea, abdominal pain, hyperkalemia, birth defects (for female patients), liver toxicity and renal toxicity. The patient may need blood work to monitor liver and kidney function and potassium levels while on therapy. The patient verbalized understanding of the proper use and possible adverse effects of spironolactone.  All of the patient's questions and concerns were addressed.
Isotretinoin Pregnancy And Lactation Text: This medication is Pregnancy Category X and is considered extremely dangerous during pregnancy. It is unknown if it is excreted in breast milk.
Topical Clindamycin Counseling: Patient counseled that this medication may cause skin irritation or allergic reactions.  In the event of skin irritation, the patient was advised to reduce the amount of the drug applied or use it less frequently.   The patient verbalized understanding of the proper use and possible adverse effects of clindamycin.  All of the patient's questions and concerns were addressed.
Dapsone Counseling: I discussed with the patient the risks of dapsone including but not limited to hemolytic anemia, agranulocytosis, rashes, methemoglobinemia, kidney failure, peripheral neuropathy, headaches, GI upset, and liver toxicity.  Patients who start dapsone require monitoring including baseline LFTs and weekly CBCs for the first month, then every month thereafter.  The patient verbalized understanding of the proper use and possible adverse effects of dapsone.  All of the patient's questions and concerns were addressed.
Aklief Pregnancy And Lactation Text: It is unknown if this medication is safe to use during pregnancy.  It is unknown if this medication is excreted in breast milk.  Breastfeeding women should use the topical cream on the smallest area of the skin for the shortest time needed while breastfeeding.  Do not apply to nipple and areola.
Tazorac Counseling:  Patient advised that medication is irritating and drying.  Patient may need to apply sparingly and wash off after an hour before eventually leaving it on overnight.  The patient verbalized understanding of the proper use and possible adverse effects of tazorac.  All of the patient's questions and concerns were addressed.
Winlevi Pregnancy And Lactation Text: This medication is considered safe during pregnancy and breastfeeding.
Azithromycin Pregnancy And Lactation Text: This medication is considered safe during pregnancy and is also secreted in breast milk.
Bactrim Counseling:  I discussed with the patient the risks of sulfa antibiotics including but not limited to GI upset, allergic reaction, drug rash, diarrhea, dizziness, photosensitivity, and yeast infections.  Rarely, more serious reactions can occur including but not limited to aplastic anemia, agranulocytosis, methemoglobinemia, blood dyscrasias, liver or kidney failure, lung infiltrates or desquamative/blistering drug rashes.
Topical Sulfur Applications Pregnancy And Lactation Text: This medication is Pregnancy Category C and has an unknown safety profile during pregnancy. It is unknown if this topical medication is excreted in breast milk.
Doxycycline Pregnancy And Lactation Text: This medication is Pregnancy Category D and not consider safe during pregnancy. It is also excreted in breast milk but is considered safe for shorter treatment courses.
Minocycline Counseling: Patient advised regarding possible photosensitivity and discoloration of the teeth, skin, lips, tongue and gums.  Patient instructed to avoid sunlight, if possible.  When exposed to sunlight, patients should wear protective clothing, sunglasses, and sunscreen.  The patient was instructed to call the office immediately if the following severe adverse effects occur:  hearing changes, easy bruising/bleeding, severe headache, or vision changes.  The patient verbalized understanding of the proper use and possible adverse effects of minocycline.  All of the patient's questions and concerns were addressed.
Tetracycline Pregnancy And Lactation Text: This medication is Pregnancy Category D and not consider safe during pregnancy. It is also excreted in breast milk.
Topical Retinoid counseling:  Patient advised to apply a pea-sized amount only at bedtime and wait 30 minutes after washing their face before applying.  If too drying, patient may add a non-comedogenic moisturizer. The patient verbalized understanding of the proper use and possible adverse effects of retinoids.  All of the patient's questions and concerns were addressed.
Birth Control Pills Counseling: Birth Control Pill Counseling: I discussed with the patient the potential side effects of OCPs including but not limited to increased risk of stroke, heart attack, thrombophlebitis, deep venous thrombosis, hepatic adenomas, breast changes, GI upset, headaches, and depression.  The patient verbalized understanding of the proper use and possible adverse effects of OCPs. All of the patient's questions and concerns were addressed.
Erythromycin Pregnancy And Lactation Text: This medication is Pregnancy Category B and is considered safe during pregnancy. It is also excreted in breast milk.
Sarecycline Counseling: Patient advised regarding possible photosensitivity and discoloration of the teeth, skin, lips, tongue and gums.  Patient instructed to avoid sunlight, if possible.  When exposed to sunlight, patients should wear protective clothing, sunglasses, and sunscreen.  The patient was instructed to call the office immediately if the following severe adverse effects occur:  hearing changes, easy bruising/bleeding, severe headache, or vision changes.  The patient verbalized understanding of the proper use and possible adverse effects of sarecycline.  All of the patient's questions and concerns were addressed.
Birth Control Pills Pregnancy And Lactation Text: This medication should be avoided if pregnant and for the first 30 days post-partum.
Tazorac Pregnancy And Lactation Text: This medication is not safe during pregnancy. It is unknown if this medication is excreted in breast milk.
Azelaic Acid Counseling: Patient counseled that medicine may cause skin irritation and to avoid applying near the eyes.  In the event of skin irritation, the patient was advised to reduce the amount of the drug applied or use it less frequently.   The patient verbalized understanding of the proper use and possible adverse effects of azelaic acid.  All of the patient's questions and concerns were addressed.
Isotretinoin Counseling: Patient should get monthly blood tests, not donate blood, not drive at night if vision affected, not share medication, and not undergo elective surgery for 6 months after tx completed. Side effects reviewed, pt to contact office should one occur.
Topical Clindamycin Pregnancy And Lactation Text: This medication is Pregnancy Category B and is considered safe during pregnancy. It is unknown if it is excreted in breast milk.
Spironolactone Pregnancy And Lactation Text: This medication can cause feminization of the male fetus and should be avoided during pregnancy. The active metabolite is also found in breast milk.
Benzoyl Peroxide Counseling: Patient counseled that medicine may cause skin irritation and bleach clothing.  In the event of skin irritation, the patient was advised to reduce the amount of the drug applied or use it less frequently.   The patient verbalized understanding of the proper use and possible adverse effects of benzoyl peroxide.  All of the patient's questions and concerns were addressed.
Azithromycin Counseling:  I discussed with the patient the risks of azithromycin including but not limited to GI upset, allergic reaction, drug rash, diarrhea, and yeast infections.
Dapsone Pregnancy And Lactation Text: This medication is Pregnancy Category C and is not considered safe during pregnancy or breast feeding.
High Dose Vitamin A Counseling: Side effects reviewed, pt to contact office should one occur.
Erythromycin Counseling:  I discussed with the patient the risks of erythromycin including but not limited to GI upset, allergic reaction, drug rash, diarrhea, increase in liver enzymes, and yeast infections.
Doxycycline Counseling:  Patient counseled regarding possible photosensitivity and increased risk for sunburn.  Patient instructed to avoid sunlight, if possible.  When exposed to sunlight, patients should wear protective clothing, sunglasses, and sunscreen.  The patient was instructed to call the office immediately if the following severe adverse effects occur:  hearing changes, easy bruising/bleeding, severe headache, or vision changes.  The patient verbalized understanding of the proper use and possible adverse effects of doxycycline.  All of the patient's questions and concerns were addressed.
High Dose Vitamin A Pregnancy And Lactation Text: High dose vitamin A therapy is contraindicated during pregnancy and breast feeding.
Tetracycline Counseling: Patient counseled regarding possible photosensitivity and increased risk for sunburn.  Patient instructed to avoid sunlight, if possible.  When exposed to sunlight, patients should wear protective clothing, sunglasses, and sunscreen.  The patient was instructed to call the office immediately if the following severe adverse effects occur:  hearing changes, easy bruising/bleeding, severe headache, or vision changes.  The patient verbalized understanding of the proper use and possible adverse effects of tetracycline.  All of the patient's questions and concerns were addressed. Patient understands to avoid pregnancy while on therapy due to potential birth defects.
Benzoyl Peroxide Pregnancy And Lactation Text: This medication is Pregnancy Category C. It is unknown if benzoyl peroxide is excreted in breast milk.
Topical Sulfur Applications Counseling: Topical Sulfur Counseling: Patient counseled that this medication may cause skin irritation or allergic reactions.  In the event of skin irritation, the patient was advised to reduce the amount of the drug applied or use it less frequently.   The patient verbalized understanding of the proper use and possible adverse effects of topical sulfur application.  All of the patient's questions and concerns were addressed.
Bactrim Pregnancy And Lactation Text: This medication is Pregnancy Category D and is known to cause fetal risk.  It is also excreted in breast milk.
Aklief counseling:  Patient advised to apply a pea-sized amount only at bedtime and wait 30 minutes after washing their face before applying.  If too drying, patient may add a non-comedogenic moisturizer.  The most commonly reported side effects including irritation, redness, scaling, dryness, stinging, burning, itching, and increased risk of sunburn.  The patient verbalized understanding of the proper use and possible adverse effects of retinoids.  All of the patient's questions and concerns were addressed.
Topical Retinoid Pregnancy And Lactation Text: This medication is Pregnancy Category C. It is unknown if this medication is excreted in breast milk.
Winlevi Counseling:  I discussed with the patient the risks of topical clascoterone including but not limited to erythema, scaling, itching, and stinging. Patient voiced their understanding.
Detail Level: Zone
Detail Level: Detailed

## 2024-03-05 NOTE — PROCEDURE: ISOTRETINOIN MONITORING
Retinoid Dermatitis Aggressive Treatment: I recommended more frequent application of Cetaphil or CeraVe to the areas of dermatitis. I also prescribed a topical steroid for twice daily use until the dermatitis resolves.
Months Of Therapy Completed: 4
Female Pregnancy Counseling Text: Female patients should also be on two forms of birth control while taking this medication and for one month after their last dose.
Myalgia Monitoring: I explained this is common when taking isotretinoin. If this worsens they will contact us.
Any Hypercholesterolemia?: Yes - Monitoring
Next Month's Dosage: Continue Current Dosage
Myalgia Treatment: I explained this is common when taking isotretinoin. If this worsens they will contact us. They may try OTC ibuprofen.
Use Therapeutic Ranged Or Therapeutic Target: please select Range or Target
Any Hypertriglyceridemia?: No
Cheilitis Aggressive Treatment: I recommended application of Vaseline or Aquaphor numerous times a day (as often as every hour) and before going to bed. I also prescribed a topical steroid for twice daily use.
Xerosis Normal Treatment: I recommended application of Cetaphil or CeraVe numerous times a day going to bed to all dry areas.
Pounds Preamble Statement (Weight Entered In Details Tab): Reported Weight in pounds:
Nosebleeds Normal Treatment: I explained this is common when taking isotretinoin. I recommended saline mist in each nostril multiple times a day. If this worsens they will contact us.
Comments: Continue 30mg bid for month 5
Dosing Month 1 (Required For Cumulative Dosing): 30mg Daily
Kilograms Preamble Statement (Weight Entered In Details Tab): Reported Weight in kilograms:
Female Completion Statement: After discussing her treatment course we decided to discontinue isotretinoin therapy at this time. I explained that she would need to continue her birth control methods for at least one month after the last dosage. She should also get a pregnancy test one month after the last dose. She shouldn't donate blood for one month after the last dose. She should call with any new symptoms of depression.
Xerosis Aggressive Treatment: I recommended application of Cetaphil or CeraVe numerous times a day going to bed to all dry areas. I also prescribed a topical steroid for twice daily use.
Lower Range (In Mg/Kg): 120
Male Completion Statement: After discussing his treatment course we decided to discontinue isotretinoin therapy at this time. He shouldn't donate blood for one month after the last dose. He should call with any new symptoms of depression.
Headache Monitoring: I recommended monitoring the headaches for now. There is no evidence of increased intracranial pressure. They were instructed to call if the headaches are worsening.
Dosing Month 2 (Required For Cumulative Dosing): 30mg BID
Detail Level: Zone
Upper Range (In Mg/Kg): 150
Target Cumulative Dosage (In Mg/Kg): 135
Hypercholesterolemia Monitoring: I explained this is common when taking isotretinoin. We will monitor closely.
Dosing Month 3 (Required For Cumulative Dosing): 60mg Daily
What Is The Patient's Gender: Female
Is Xerosis Present?: Yes - Normal Treatment
Retinoid Dermatitis Normal Treatment: I recommended more frequent application of Cetaphil or CeraVe to the areas of dermatitis.
Add Associated Diagnosis When Managing Medication Side Effects: Yes
Cheilitis Normal Treatment: I recommended application of Vaseline or Aquaphor numerous times a day (as often as every hour) and before going to bed.
Dosing Month 4 (Required For Cumulative Dosing): 60mg BID
Xerosis Normal Treatment: I recommended application of Cetaphil or CeraVe numerous times a day and before going to bed to all dry areas.
Are Labs Available For Review?: No- Labs Deferred This Month
Ipledge Number (Optional): [id number]
Xerosis Aggressive Treatment: I recommended application of Cetaphil or CeraVe numerous times a day and before going to bed to all dry areas. I also prescribed a topical steroid for twice daily use.
Patient Weight (Optional But Required For Cumulative Dose-Numbers And Decimals Only): 123
Weight Units: pounds
Counseling Text: I reviewed the side effect in detail. Patient should get monthly blood tests, not donate blood, not drive at night if vision affected, and not share medication.

## 2024-03-05 NOTE — PROCEDURE: SUNSCREEN RECOMMENDATIONS

## 2024-04-23 ENCOUNTER — APPOINTMENT (RX ONLY)
Dept: URBAN - METROPOLITAN AREA CLINIC 115 | Facility: CLINIC | Age: 49
Setting detail: DERMATOLOGY
End: 2024-04-23

## 2024-04-23 DIAGNOSIS — L70.0 ACNE VULGARIS: ICD-10-CM

## 2024-04-23 PROCEDURE — ? ADDITIONAL NOTES

## 2024-04-23 PROCEDURE — ? ISOTRETINOIN MONITORING

## 2024-04-23 PROCEDURE — ? COUNSELING

## 2024-04-23 PROCEDURE — ? PRESCRIPTION

## 2024-04-23 PROCEDURE — 99214 OFFICE O/P EST MOD 30 MIN: CPT

## 2024-04-23 RX ORDER — ISOTRETINOIN 30 MG/1
CAPSULE ORAL
Qty: 60 | Refills: 0 | Status: CANCELLED

## 2024-04-23 ASSESSMENT — LOCATION ZONE DERM: LOCATION ZONE: FACE

## 2024-04-23 ASSESSMENT — LOCATION SIMPLE DESCRIPTION DERM
LOCATION SIMPLE: RIGHT CHEEK
LOCATION SIMPLE: LEFT CHEEK

## 2024-04-23 NOTE — PROCEDURE: ADDITIONAL NOTES
Additional Notes: I suggested to the patient that she completes her medication by taking one pill per week until she finishes the entire course.
Render Risk Assessment In Note?: no
Detail Level: Simple

## 2024-04-23 NOTE — PROCEDURE: COUNSELING
Spoke to patient and conveyed Xray result. Patient agrees to doing PT.  
Use Enhanced Medication Counseling?: No
Azelaic Acid Pregnancy And Lactation Text: This medication is considered safe during pregnancy and breast feeding.
Spironolactone Counseling: Patient advised regarding risks of diarrhea, abdominal pain, hyperkalemia, birth defects (for female patients), liver toxicity and renal toxicity. The patient may need blood work to monitor liver and kidney function and potassium levels while on therapy. The patient verbalized understanding of the proper use and possible adverse effects of spironolactone.  All of the patient's questions and concerns were addressed.
Isotretinoin Pregnancy And Lactation Text: This medication is Pregnancy Category X and is considered extremely dangerous during pregnancy. It is unknown if it is excreted in breast milk.
Topical Clindamycin Counseling: Patient counseled that this medication may cause skin irritation or allergic reactions.  In the event of skin irritation, the patient was advised to reduce the amount of the drug applied or use it less frequently.   The patient verbalized understanding of the proper use and possible adverse effects of clindamycin.  All of the patient's questions and concerns were addressed.
Dapsone Counseling: I discussed with the patient the risks of dapsone including but not limited to hemolytic anemia, agranulocytosis, rashes, methemoglobinemia, kidney failure, peripheral neuropathy, headaches, GI upset, and liver toxicity.  Patients who start dapsone require monitoring including baseline LFTs and weekly CBCs for the first month, then every month thereafter.  The patient verbalized understanding of the proper use and possible adverse effects of dapsone.  All of the patient's questions and concerns were addressed.
Aklief Pregnancy And Lactation Text: It is unknown if this medication is safe to use during pregnancy.  It is unknown if this medication is excreted in breast milk.  Breastfeeding women should use the topical cream on the smallest area of the skin for the shortest time needed while breastfeeding.  Do not apply to nipple and areola.
Tazorac Counseling:  Patient advised that medication is irritating and drying.  Patient may need to apply sparingly and wash off after an hour before eventually leaving it on overnight.  The patient verbalized understanding of the proper use and possible adverse effects of tazorac.  All of the patient's questions and concerns were addressed.
Winlevi Pregnancy And Lactation Text: This medication is considered safe during pregnancy and breastfeeding.
Azithromycin Pregnancy And Lactation Text: This medication is considered safe during pregnancy and is also secreted in breast milk.
Bactrim Counseling:  I discussed with the patient the risks of sulfa antibiotics including but not limited to GI upset, allergic reaction, drug rash, diarrhea, dizziness, photosensitivity, and yeast infections.  Rarely, more serious reactions can occur including but not limited to aplastic anemia, agranulocytosis, methemoglobinemia, blood dyscrasias, liver or kidney failure, lung infiltrates or desquamative/blistering drug rashes.
Topical Sulfur Applications Pregnancy And Lactation Text: This medication is Pregnancy Category C and has an unknown safety profile during pregnancy. It is unknown if this topical medication is excreted in breast milk.
Doxycycline Pregnancy And Lactation Text: This medication is Pregnancy Category D and not consider safe during pregnancy. It is also excreted in breast milk but is considered safe for shorter treatment courses.
Minocycline Counseling: Patient advised regarding possible photosensitivity and discoloration of the teeth, skin, lips, tongue and gums.  Patient instructed to avoid sunlight, if possible.  When exposed to sunlight, patients should wear protective clothing, sunglasses, and sunscreen.  The patient was instructed to call the office immediately if the following severe adverse effects occur:  hearing changes, easy bruising/bleeding, severe headache, or vision changes.  The patient verbalized understanding of the proper use and possible adverse effects of minocycline.  All of the patient's questions and concerns were addressed.
Tetracycline Pregnancy And Lactation Text: This medication is Pregnancy Category D and not consider safe during pregnancy. It is also excreted in breast milk.
Topical Retinoid counseling:  Patient advised to apply a pea-sized amount only at bedtime and wait 30 minutes after washing their face before applying.  If too drying, patient may add a non-comedogenic moisturizer. The patient verbalized understanding of the proper use and possible adverse effects of retinoids.  All of the patient's questions and concerns were addressed.
Birth Control Pills Counseling: Birth Control Pill Counseling: I discussed with the patient the potential side effects of OCPs including but not limited to increased risk of stroke, heart attack, thrombophlebitis, deep venous thrombosis, hepatic adenomas, breast changes, GI upset, headaches, and depression.  The patient verbalized understanding of the proper use and possible adverse effects of OCPs. All of the patient's questions and concerns were addressed.
Erythromycin Pregnancy And Lactation Text: This medication is Pregnancy Category B and is considered safe during pregnancy. It is also excreted in breast milk.
Sarecycline Counseling: Patient advised regarding possible photosensitivity and discoloration of the teeth, skin, lips, tongue and gums.  Patient instructed to avoid sunlight, if possible.  When exposed to sunlight, patients should wear protective clothing, sunglasses, and sunscreen.  The patient was instructed to call the office immediately if the following severe adverse effects occur:  hearing changes, easy bruising/bleeding, severe headache, or vision changes.  The patient verbalized understanding of the proper use and possible adverse effects of sarecycline.  All of the patient's questions and concerns were addressed.
Birth Control Pills Pregnancy And Lactation Text: This medication should be avoided if pregnant and for the first 30 days post-partum.
Tazorac Pregnancy And Lactation Text: This medication is not safe during pregnancy. It is unknown if this medication is excreted in breast milk.
Azelaic Acid Counseling: Patient counseled that medicine may cause skin irritation and to avoid applying near the eyes.  In the event of skin irritation, the patient was advised to reduce the amount of the drug applied or use it less frequently.   The patient verbalized understanding of the proper use and possible adverse effects of azelaic acid.  All of the patient's questions and concerns were addressed.
Isotretinoin Counseling: Patient should get monthly blood tests, not donate blood, not drive at night if vision affected, not share medication, and not undergo elective surgery for 6 months after tx completed. Side effects reviewed, pt to contact office should one occur.
Topical Clindamycin Pregnancy And Lactation Text: This medication is Pregnancy Category B and is considered safe during pregnancy. It is unknown if it is excreted in breast milk.
Spironolactone Pregnancy And Lactation Text: This medication can cause feminization of the male fetus and should be avoided during pregnancy. The active metabolite is also found in breast milk.
Benzoyl Peroxide Counseling: Patient counseled that medicine may cause skin irritation and bleach clothing.  In the event of skin irritation, the patient was advised to reduce the amount of the drug applied or use it less frequently.   The patient verbalized understanding of the proper use and possible adverse effects of benzoyl peroxide.  All of the patient's questions and concerns were addressed.
Azithromycin Counseling:  I discussed with the patient the risks of azithromycin including but not limited to GI upset, allergic reaction, drug rash, diarrhea, and yeast infections.
Dapsone Pregnancy And Lactation Text: This medication is Pregnancy Category C and is not considered safe during pregnancy or breast feeding.
High Dose Vitamin A Counseling: Side effects reviewed, pt to contact office should one occur.
Erythromycin Counseling:  I discussed with the patient the risks of erythromycin including but not limited to GI upset, allergic reaction, drug rash, diarrhea, increase in liver enzymes, and yeast infections.
Doxycycline Counseling:  Patient counseled regarding possible photosensitivity and increased risk for sunburn.  Patient instructed to avoid sunlight, if possible.  When exposed to sunlight, patients should wear protective clothing, sunglasses, and sunscreen.  The patient was instructed to call the office immediately if the following severe adverse effects occur:  hearing changes, easy bruising/bleeding, severe headache, or vision changes.  The patient verbalized understanding of the proper use and possible adverse effects of doxycycline.  All of the patient's questions and concerns were addressed.
High Dose Vitamin A Pregnancy And Lactation Text: High dose vitamin A therapy is contraindicated during pregnancy and breast feeding.
Tetracycline Counseling: Patient counseled regarding possible photosensitivity and increased risk for sunburn.  Patient instructed to avoid sunlight, if possible.  When exposed to sunlight, patients should wear protective clothing, sunglasses, and sunscreen.  The patient was instructed to call the office immediately if the following severe adverse effects occur:  hearing changes, easy bruising/bleeding, severe headache, or vision changes.  The patient verbalized understanding of the proper use and possible adverse effects of tetracycline.  All of the patient's questions and concerns were addressed. Patient understands to avoid pregnancy while on therapy due to potential birth defects.
Benzoyl Peroxide Pregnancy And Lactation Text: This medication is Pregnancy Category C. It is unknown if benzoyl peroxide is excreted in breast milk.
Topical Sulfur Applications Counseling: Topical Sulfur Counseling: Patient counseled that this medication may cause skin irritation or allergic reactions.  In the event of skin irritation, the patient was advised to reduce the amount of the drug applied or use it less frequently.   The patient verbalized understanding of the proper use and possible adverse effects of topical sulfur application.  All of the patient's questions and concerns were addressed.
Bactrim Pregnancy And Lactation Text: This medication is Pregnancy Category D and is known to cause fetal risk.  It is also excreted in breast milk.
Aklief counseling:  Patient advised to apply a pea-sized amount only at bedtime and wait 30 minutes after washing their face before applying.  If too drying, patient may add a non-comedogenic moisturizer.  The most commonly reported side effects including irritation, redness, scaling, dryness, stinging, burning, itching, and increased risk of sunburn.  The patient verbalized understanding of the proper use and possible adverse effects of retinoids.  All of the patient's questions and concerns were addressed.
Topical Retinoid Pregnancy And Lactation Text: This medication is Pregnancy Category C. It is unknown if this medication is excreted in breast milk.
Winlevi Counseling:  I discussed with the patient the risks of topical clascoterone including but not limited to erythema, scaling, itching, and stinging. Patient voiced their understanding.
Detail Level: Zone

## 2024-04-23 NOTE — PROCEDURE: ISOTRETINOIN MONITORING
Retinoid Dermatitis Aggressive Treatment: I recommended more frequent application of Cetaphil or CeraVe to the areas of dermatitis. I also prescribed a topical steroid for twice daily use until the dermatitis resolves.
Months Of Therapy Completed: 5
Female Pregnancy Counseling Text: Female patients should also be on two forms of birth control while taking this medication and for one month after their last dose.
Myalgia Monitoring: I explained this is common when taking isotretinoin. If this worsens they will contact us.
Any Hypercholesterolemia?: Yes - Monitoring
Next Month's Dosage: Discontinue
Myalgia Treatment: I explained this is common when taking isotretinoin. If this worsens they will contact us. They may try OTC ibuprofen.
Use Therapeutic Ranged Or Therapeutic Target: please select Range or Target
Any Hypertriglyceridemia?: No
Cheilitis Aggressive Treatment: I recommended application of Vaseline or Aquaphor numerous times a day (as often as every hour) and before going to bed. I also prescribed a topical steroid for twice daily use.
Xerosis Normal Treatment: I recommended application of Cetaphil or CeraVe numerous times a day going to bed to all dry areas.
Pounds Preamble Statement (Weight Entered In Details Tab): Reported Weight in pounds:
Nosebleeds Normal Treatment: I explained this is common when taking isotretinoin. I recommended saline mist in each nostril multiple times a day. If this worsens they will contact us.
Comments: Pt is ready to stop accutane.  Clear, pores have shrunk, likes current skin condition and met cumulative dose.  Has a handful of pills left that she will take as “Rosa dose”. Pt can’t get pregnant.
Dosing Month 1 (Required For Cumulative Dosing): 30mg Daily
Kilograms Preamble Statement (Weight Entered In Details Tab): Reported Weight in kilograms:
Female Completion Statement: After discussing her treatment course we decided to discontinue isotretinoin therapy at this time. I explained that she would need to continue her birth control methods for at least one month after the last dosage. She should also get a pregnancy test one month after the last dose. She shouldn't donate blood for one month after the last dose. She should call with any new symptoms of depression.
Xerosis Aggressive Treatment: I recommended application of Cetaphil or CeraVe numerous times a day going to bed to all dry areas. I also prescribed a topical steroid for twice daily use.
Lower Range (In Mg/Kg): 120
Male Completion Statement: After discussing his treatment course we decided to discontinue isotretinoin therapy at this time. He shouldn't donate blood for one month after the last dose. He should call with any new symptoms of depression.
Headache Monitoring: I recommended monitoring the headaches for now. There is no evidence of increased intracranial pressure. They were instructed to call if the headaches are worsening.
Dosing Month 2 (Required For Cumulative Dosing): 30mg BID
Detail Level: Zone
Upper Range (In Mg/Kg): 150
Target Cumulative Dosage (In Mg/Kg): 135
Hypercholesterolemia Monitoring: I explained this is common when taking isotretinoin. We will monitor closely.
Dosing Month 3 (Required For Cumulative Dosing): 60mg Daily
What Is The Patient's Gender: Female
Is Xerosis Present?: Yes - Normal Treatment
Retinoid Dermatitis Normal Treatment: I recommended more frequent application of Cetaphil or CeraVe to the areas of dermatitis.
Add Associated Diagnosis When Managing Medication Side Effects: Yes
Cheilitis Normal Treatment: I recommended application of Vaseline or Aquaphor numerous times a day (as often as every hour) and before going to bed.
Xerosis Normal Treatment: I recommended application of Cetaphil or CeraVe numerous times a day and before going to bed to all dry areas.
Are Labs Available For Review?: No- Labs Deferred This Month
Ipledge Number (Optional): [id number]
Xerosis Aggressive Treatment: I recommended application of Cetaphil or CeraVe numerous times a day and before going to bed to all dry areas. I also prescribed a topical steroid for twice daily use.
Patient Weight (Optional But Required For Cumulative Dose-Numbers And Decimals Only): 123
Weight Units: pounds
Counseling Text: I reviewed the side effect in detail. Patient should get monthly blood tests, not donate blood, not drive at night if vision affected, and not share medication.

## 2024-05-28 NOTE — PROCEDURE: COUNSELING
Use Enhanced Medication Counseling?: No
Azelaic Acid Pregnancy And Lactation Text: This medication is considered safe during pregnancy and breast feeding.
Spironolactone Counseling: Patient advised regarding risks of diarrhea, abdominal pain, hyperkalemia, birth defects (for female patients), liver toxicity and renal toxicity. The patient may need blood work to monitor liver and kidney function and potassium levels while on therapy. The patient verbalized understanding of the proper use and possible adverse effects of spironolactone.  All of the patient's questions and concerns were addressed.
Isotretinoin Pregnancy And Lactation Text: This medication is Pregnancy Category X and is considered extremely dangerous during pregnancy. It is unknown if it is excreted in breast milk.
Topical Clindamycin Counseling: Patient counseled that this medication may cause skin irritation or allergic reactions.  In the event of skin irritation, the patient was advised to reduce the amount of the drug applied or use it less frequently.   The patient verbalized understanding of the proper use and possible adverse effects of clindamycin.  All of the patient's questions and concerns were addressed.
Dapsone Counseling: I discussed with the patient the risks of dapsone including but not limited to hemolytic anemia, agranulocytosis, rashes, methemoglobinemia, kidney failure, peripheral neuropathy, headaches, GI upset, and liver toxicity.  Patients who start dapsone require monitoring including baseline LFTs and weekly CBCs for the first month, then every month thereafter.  The patient verbalized understanding of the proper use and possible adverse effects of dapsone.  All of the patient's questions and concerns were addressed.
Aklief Pregnancy And Lactation Text: It is unknown if this medication is safe to use during pregnancy.  It is unknown if this medication is excreted in breast milk.  Breastfeeding women should use the topical cream on the smallest area of the skin for the shortest time needed while breastfeeding.  Do not apply to nipple and areola.
Tazorac Counseling:  Patient advised that medication is irritating and drying.  Patient may need to apply sparingly and wash off after an hour before eventually leaving it on overnight.  The patient verbalized understanding of the proper use and possible adverse effects of tazorac.  All of the patient's questions and concerns were addressed.
Winlevi Pregnancy And Lactation Text: This medication is considered safe during pregnancy and breastfeeding.
Azithromycin Pregnancy And Lactation Text: This medication is considered safe during pregnancy and is also secreted in breast milk.
Bactrim Counseling:  I discussed with the patient the risks of sulfa antibiotics including but not limited to GI upset, allergic reaction, drug rash, diarrhea, dizziness, photosensitivity, and yeast infections.  Rarely, more serious reactions can occur including but not limited to aplastic anemia, agranulocytosis, methemoglobinemia, blood dyscrasias, liver or kidney failure, lung infiltrates or desquamative/blistering drug rashes.
Topical Sulfur Applications Pregnancy And Lactation Text: This medication is Pregnancy Category C and has an unknown safety profile during pregnancy. It is unknown if this topical medication is excreted in breast milk.
Doxycycline Pregnancy And Lactation Text: This medication is Pregnancy Category D and not consider safe during pregnancy. It is also excreted in breast milk but is considered safe for shorter treatment courses.
Minocycline Counseling: Patient advised regarding possible photosensitivity and discoloration of the teeth, skin, lips, tongue and gums.  Patient instructed to avoid sunlight, if possible.  When exposed to sunlight, patients should wear protective clothing, sunglasses, and sunscreen.  The patient was instructed to call the office immediately if the following severe adverse effects occur:  hearing changes, easy bruising/bleeding, severe headache, or vision changes.  The patient verbalized understanding of the proper use and possible adverse effects of minocycline.  All of the patient's questions and concerns were addressed.
Tetracycline Pregnancy And Lactation Text: This medication is Pregnancy Category D and not consider safe during pregnancy. It is also excreted in breast milk.
Topical Retinoid counseling:  Patient advised to apply a pea-sized amount only at bedtime and wait 30 minutes after washing their face before applying.  If too drying, patient may add a non-comedogenic moisturizer. The patient verbalized understanding of the proper use and possible adverse effects of retinoids.  All of the patient's questions and concerns were addressed.
Birth Control Pills Counseling: Birth Control Pill Counseling: I discussed with the patient the potential side effects of OCPs including but not limited to increased risk of stroke, heart attack, thrombophlebitis, deep venous thrombosis, hepatic adenomas, breast changes, GI upset, headaches, and depression.  The patient verbalized understanding of the proper use and possible adverse effects of OCPs. All of the patient's questions and concerns were addressed.
Erythromycin Pregnancy And Lactation Text: This medication is Pregnancy Category B and is considered safe during pregnancy. It is also excreted in breast milk.
Sarecycline Counseling: Patient advised regarding possible photosensitivity and discoloration of the teeth, skin, lips, tongue and gums.  Patient instructed to avoid sunlight, if possible.  When exposed to sunlight, patients should wear protective clothing, sunglasses, and sunscreen.  The patient was instructed to call the office immediately if the following severe adverse effects occur:  hearing changes, easy bruising/bleeding, severe headache, or vision changes.  The patient verbalized understanding of the proper use and possible adverse effects of sarecycline.  All of the patient's questions and concerns were addressed.
Birth Control Pills Pregnancy And Lactation Text: This medication should be avoided if pregnant and for the first 30 days post-partum.
Tazorac Pregnancy And Lactation Text: This medication is not safe during pregnancy. It is unknown if this medication is excreted in breast milk.
Azelaic Acid Counseling: Patient counseled that medicine may cause skin irritation and to avoid applying near the eyes.  In the event of skin irritation, the patient was advised to reduce the amount of the drug applied or use it less frequently.   The patient verbalized understanding of the proper use and possible adverse effects of azelaic acid.  All of the patient's questions and concerns were addressed.
Isotretinoin Counseling: Patient should get monthly blood tests, not donate blood, not drive at night if vision affected, not share medication, and not undergo elective surgery for 6 months after tx completed. Side effects reviewed, pt to contact office should one occur.
Topical Clindamycin Pregnancy And Lactation Text: This medication is Pregnancy Category B and is considered safe during pregnancy. It is unknown if it is excreted in breast milk.
Spironolactone Pregnancy And Lactation Text: This medication can cause feminization of the male fetus and should be avoided during pregnancy. The active metabolite is also found in breast milk.
Benzoyl Peroxide Counseling: Patient counseled that medicine may cause skin irritation and bleach clothing.  In the event of skin irritation, the patient was advised to reduce the amount of the drug applied or use it less frequently.   The patient verbalized understanding of the proper use and possible adverse effects of benzoyl peroxide.  All of the patient's questions and concerns were addressed.
Azithromycin Counseling:  I discussed with the patient the risks of azithromycin including but not limited to GI upset, allergic reaction, drug rash, diarrhea, and yeast infections.
Dapsone Pregnancy And Lactation Text: This medication is Pregnancy Category C and is not considered safe during pregnancy or breast feeding.
High Dose Vitamin A Counseling: Side effects reviewed, pt to contact office should one occur.
Erythromycin Counseling:  I discussed with the patient the risks of erythromycin including but not limited to GI upset, allergic reaction, drug rash, diarrhea, increase in liver enzymes, and yeast infections.
Doxycycline Counseling:  Patient counseled regarding possible photosensitivity and increased risk for sunburn.  Patient instructed to avoid sunlight, if possible.  When exposed to sunlight, patients should wear protective clothing, sunglasses, and sunscreen.  The patient was instructed to call the office immediately if the following severe adverse effects occur:  hearing changes, easy bruising/bleeding, severe headache, or vision changes.  The patient verbalized understanding of the proper use and possible adverse effects of doxycycline.  All of the patient's questions and concerns were addressed.
High Dose Vitamin A Pregnancy And Lactation Text: High dose vitamin A therapy is contraindicated during pregnancy and breast feeding.
Tetracycline Counseling: Patient counseled regarding possible photosensitivity and increased risk for sunburn.  Patient instructed to avoid sunlight, if possible.  When exposed to sunlight, patients should wear protective clothing, sunglasses, and sunscreen.  The patient was instructed to call the office immediately if the following severe adverse effects occur:  hearing changes, easy bruising/bleeding, severe headache, or vision changes.  The patient verbalized understanding of the proper use and possible adverse effects of tetracycline.  All of the patient's questions and concerns were addressed. Patient understands to avoid pregnancy while on therapy due to potential birth defects.
Benzoyl Peroxide Pregnancy And Lactation Text: This medication is Pregnancy Category C. It is unknown if benzoyl peroxide is excreted in breast milk.
Topical Sulfur Applications Counseling: Topical Sulfur Counseling: Patient counseled that this medication may cause skin irritation or allergic reactions.  In the event of skin irritation, the patient was advised to reduce the amount of the drug applied or use it less frequently.   The patient verbalized understanding of the proper use and possible adverse effects of topical sulfur application.  All of the patient's questions and concerns were addressed.
Bactrim Pregnancy And Lactation Text: This medication is Pregnancy Category D and is known to cause fetal risk.  It is also excreted in breast milk.
Aklief counseling:  Patient advised to apply a pea-sized amount only at bedtime and wait 30 minutes after washing their face before applying.  If too drying, patient may add a non-comedogenic moisturizer.  The most commonly reported side effects including irritation, redness, scaling, dryness, stinging, burning, itching, and increased risk of sunburn.  The patient verbalized understanding of the proper use and possible adverse effects of retinoids.  All of the patient's questions and concerns were addressed.
Topical Retinoid Pregnancy And Lactation Text: This medication is Pregnancy Category C. It is unknown if this medication is excreted in breast milk.
Winlevi Counseling:  I discussed with the patient the risks of topical clascoterone including but not limited to erythema, scaling, itching, and stinging. Patient voiced their understanding.
Detail Level: Zone
No.